# Patient Record
(demographics unavailable — no encounter records)

---

## 2025-01-27 NOTE — PHYSICAL EXAM
[Chaperone Present] : A chaperone was present in the examining room during all aspects of the physical examination [[id number]] : A chaperone was present during the pelvic exam. [Appropriately responsive] : appropriately responsive [Alert] : alert [No Acute Distress] : no acute distress [Soft] : soft [Non-tender] : non-tender [Non-distended] : non-distended [Oriented x3] : oriented x3 [Labia Majora] : normal [Labia Minora] : normal [Normal] : normal [Uterine Adnexae] : normal [FreeTextEntry2] : [facility]

## 2025-01-27 NOTE — HISTORY OF PRESENT ILLNESS
[Menarche Age ____] : age at menarche was [unfilled] [Excessive Bleeding] : bleeding has been excessive [Irregular Cycle Intervals] : are  irregular [Dysmenorrhea] : dysmenorrhea [Condoms] : uses condoms [Y] : Patient is sexually active [N] : Patient denies prior pregnancies [Normal Amount/Duration] :  normal amount and duration [Menarche Age: ____] : age at menarche was [unfilled] [No] : Patient does not have concerns regarding sex [Currently Active] : currently active [LMPDate] : 12/26/24 [MensesFreq] : 12-40 [MensesLength] : 7 [MensesAmount] : heavy [FreeTextEntry1] : 12/24/24

## 2025-01-27 NOTE — PROCEDURE
[Cervical Pap Smear] : cervical Pap smear [Liquid Base] : liquid base [GC & Chlamydia via Pap] : GC & Chlamydia via Pap [Tolerated Well] : the patient tolerated the procedure well [No Complications] : there were no complications [Transvaginal Ultrasound] : transvaginal ultrasound [L: ___ cm] : L: [unfilled] cm [W: ___cm] : W: [unfilled] cm [H: ___ cm] : H: [unfilled] cm [FreeTextEntry9] : abnormal outside us [FreeTextEntry5] : 56.78cc vol [FreeTextEntry7] : 4.14cc vol [FreeTextEntry8] : 6.39cc vol

## 2025-01-27 NOTE — PLAN
[FreeTextEntry1] : The patient's history and presentation were reviewed and discussed with Dr. Cadet. An assessment was conducted in collaboration with Dr. Cadet, and the plan of care was formulated and discussed accordingly.  Pt seen and examined with NP, Amy Penn. The analysis and plan were agreed upon and implemented.